# Patient Record
Sex: FEMALE | Race: BLACK OR AFRICAN AMERICAN | NOT HISPANIC OR LATINO | URBAN - METROPOLITAN AREA
[De-identification: names, ages, dates, MRNs, and addresses within clinical notes are randomized per-mention and may not be internally consistent; named-entity substitution may affect disease eponyms.]

---

## 2017-01-06 ENCOUNTER — EMERGENCY (EMERGENCY)
Facility: HOSPITAL | Age: 57
LOS: 1 days | Discharge: HOME | End: 2017-01-06
Attending: STUDENT IN AN ORGANIZED HEALTH CARE EDUCATION/TRAINING PROGRAM
Payer: COMMERCIAL

## 2017-01-06 VITALS
TEMPERATURE: 98 F | HEART RATE: 104 BPM | HEIGHT: 64 IN | RESPIRATION RATE: 20 BRPM | WEIGHT: 175.93 LBS | OXYGEN SATURATION: 97 % | DIASTOLIC BLOOD PRESSURE: 73 MMHG | SYSTOLIC BLOOD PRESSURE: 114 MMHG

## 2017-01-06 PROCEDURE — 87070 CULTURE OTHR SPECIMN AEROBIC: CPT

## 2017-01-06 PROCEDURE — 90715 TDAP VACCINE 7 YRS/> IM: CPT

## 2017-01-06 PROCEDURE — 99283 EMERGENCY DEPT VISIT LOW MDM: CPT | Mod: 25

## 2017-01-06 PROCEDURE — 87186 SC STD MICRODIL/AGAR DIL: CPT

## 2017-01-06 PROCEDURE — 10060 I&D ABSCESS SIMPLE/SINGLE: CPT

## 2017-01-06 RX ORDER — TETANUS TOXOID, REDUCED DIPHTHERIA TOXOID AND ACELLULAR PERTUSSIS VACCINE, ADSORBED 5; 2.5; 8; 8; 2.5 [IU]/.5ML; [IU]/.5ML; UG/.5ML; UG/.5ML; UG/.5ML
0.5 SUSPENSION INTRAMUSCULAR ONCE
Qty: 0 | Refills: 0 | Status: COMPLETED | OUTPATIENT
Start: 2017-01-06 | End: 2017-01-06

## 2017-01-06 RX ADMIN — Medication 300 MILLIGRAM(S): at 16:19

## 2017-01-06 RX ADMIN — TETANUS TOXOID, REDUCED DIPHTHERIA TOXOID AND ACELLULAR PERTUSSIS VACCINE, ADSORBED 0.5 MILLILITER(S): 5; 2.5; 8; 8; 2.5 SUSPENSION INTRAMUSCULAR at 16:19

## 2017-01-06 NOTE — ED STATDOCS - SKIN, MLM
Nickel sized area of fluctuance, tenderness with scant purulent drainage coming from the anterior proximal right thigh with 6cm surrounding erythema.

## 2017-01-06 NOTE — ED STATDOCS - NS ED MD SCRIBE ATTENDING SCRIBE SECTIONS
PAST MEDICAL/SURGICAL/SOCIAL HISTORY/REVIEW OF SYSTEMS/HISTORY OF PRESENT ILLNESS/HIV/DISPOSITION/PHYSICAL EXAM/VITAL SIGNS( Pullset)

## 2017-01-06 NOTE — ED STATDOCS - OBJECTIVE STATEMENT
56 y/o F presents to the ED complaining of redness, swelling, and drainage to right upper thigh x 3 days. Pt noted a red spot on her right thigh 2 days ago and states redness and swelling has worsened. She reports purulent drainage yesterday. Pt denies fevers, nausea, vomiting, weakness, dizziness, abdominal pain and back pain. She denies previous episodes. Pt has been applying antibiotic ointment. Pt denies tobacco and alcohol use. Adverse reaction to penicillin. Last Td > 5 years. No further complaints at this time.  PCP: Dr. Easton Guallpa

## 2017-01-06 NOTE — ED STATDOCS - DETAILS:
I, Colton Jenkins, performed the initial face to face bedside interview with this patient regarding history of present illness, review of symptoms and relevant past medical, social and family history.  I completed an independent physical examination.  I was the initial provider who evaluated this patient.  The history, relevant review of systems, past medical and surgical history, medical decision making, and physical examination was documented by the scribe in my presence and I attest to the accuracy of the documentation.  I have signed out the follow up of any pending tests (i.e. labs, radiological studies) to the ACP.  I have communicated the patient’s plan of care and disposition with the ACP.

## 2017-01-06 NOTE — ED ADULT NURSE NOTE - OBJECTIVE STATEMENT
pt presents to ED with right upper thigh redness with drainage x two days.  warm to touch. afebrile. denies any other complaints. a&ox3 PERRL maex4 lungs cta. denies chest pain/sob. abd soft nt nd +bs denies n/v/d, skin w/d/i. will continue to monitor and reassess

## 2017-01-08 ENCOUNTER — EMERGENCY (EMERGENCY)
Facility: HOSPITAL | Age: 57
LOS: 1 days | Discharge: HOME | End: 2017-01-08
Attending: EMERGENCY MEDICINE
Payer: COMMERCIAL

## 2017-01-08 VITALS
TEMPERATURE: 100 F | RESPIRATION RATE: 20 BRPM | OXYGEN SATURATION: 98 % | SYSTOLIC BLOOD PRESSURE: 100 MMHG | HEART RATE: 80 BPM | DIASTOLIC BLOOD PRESSURE: 71 MMHG

## 2017-01-08 VITALS — WEIGHT: 175.93 LBS

## 2017-01-08 PROCEDURE — 99283 EMERGENCY DEPT VISIT LOW MDM: CPT

## 2017-01-08 RX ORDER — AZTREONAM 2 G
1 VIAL (EA) INJECTION
Qty: 20 | Refills: 0 | OUTPATIENT
Start: 2017-01-08 | End: 2017-01-18

## 2017-01-08 RX ADMIN — Medication 1 TABLET(S): at 14:59

## 2017-01-08 NOTE — ED STATDOCS - CARE PLAN
Principal Discharge DX:	Suture check Principal Discharge DX:	Cellulitis  Secondary Diagnosis:	Wound check, abscess

## 2017-01-08 NOTE — ED STATDOCS - SKIN, MLM
Packing in place to anterior right thigh with 32cm length x 12cm width surrounding erythema. Packing removed with no expressible purulence.

## 2017-01-08 NOTE — ED STATDOCS - OBJECTIVE STATEMENT
This is a 57 year old female returning to the ED for wound check. Pt had I&D for right upper thigh abscess 2 days ago and returns today to have packing removed. She has been compliant with clindamycin. Pt reports drainage from abscess. Pt denies fevers and chills. Allergy to penicillin. No further complaints at this time.

## 2017-01-08 NOTE — ED STATDOCS - PROGRESS NOTE DETAILS
showed picture of initial lesion, measurement of erythema documented in prior chart  does not correspond  with picture shown. Cellulitis today appears more mild but with questionable medial spread.

## 2017-01-08 NOTE — ED STATDOCS - DETAILS:
I, Laurel Allen, performed the initial face to face bedside interview with this patient regarding history of present illness, review of symptoms and relevant past medical, social and family history.  I completed an independent physical examination.  I was the initial provider who evaluated this patient.   The history, relevant review of systems, past medical and surgical history, medical decision making, and physical examination was documented by the scribe in my presence and I attest to the accuracy of the documentation.

## 2017-01-08 NOTE — ED STATDOCS - NS ED MD SCRIBE ATTENDING SCRIBE SECTIONS
PAST MEDICAL/SURGICAL/SOCIAL HISTORY/DISPOSITION/REVIEW OF SYSTEMS/VITAL SIGNS( Pullset)/HISTORY OF PRESENT ILLNESS/HIV/PHYSICAL EXAM

## 2017-01-08 NOTE — ED STATDOCS - MEDICAL DECISION MAKING DETAILS
Interval evaluation of abscess. Wound appears clean, dry, with no persistent purulent drainage. Surrounding cellulitis, less erythematous with less amt of spread. culture reviewed but no sensitives reported. Adding Bactrim to increase coverage. Outlined erythema with marker and instructed pt to return to ED if develops fever or worsening spread of erythema.

## 2022-05-30 ENCOUNTER — NON-APPOINTMENT (OUTPATIENT)
Age: 62
End: 2022-05-30

## 2022-06-03 ENCOUNTER — NON-APPOINTMENT (OUTPATIENT)
Age: 62
End: 2022-06-03

## 2022-06-06 ENCOUNTER — TRANSCRIPTION ENCOUNTER (OUTPATIENT)
Age: 62
End: 2022-06-06

## 2022-06-07 ENCOUNTER — OUTPATIENT (OUTPATIENT)
Dept: OUTPATIENT SERVICES | Facility: HOSPITAL | Age: 62
LOS: 1 days | End: 2022-06-07

## 2022-06-07 ENCOUNTER — APPOINTMENT (OUTPATIENT)
Dept: DISASTER EMERGENCY | Facility: HOSPITAL | Age: 62
End: 2022-06-07

## 2022-06-07 VITALS
HEART RATE: 72 BPM | TEMPERATURE: 98 F | DIASTOLIC BLOOD PRESSURE: 74 MMHG | SYSTOLIC BLOOD PRESSURE: 107 MMHG | RESPIRATION RATE: 18 BRPM | OXYGEN SATURATION: 96 %

## 2022-06-07 VITALS
OXYGEN SATURATION: 97 % | WEIGHT: 171.96 LBS | HEIGHT: 64 IN | TEMPERATURE: 98 F | SYSTOLIC BLOOD PRESSURE: 142 MMHG | DIASTOLIC BLOOD PRESSURE: 88 MMHG | HEART RATE: 88 BPM | RESPIRATION RATE: 18 BRPM

## 2022-06-07 DIAGNOSIS — U07.1 COVID-19: ICD-10-CM

## 2022-06-07 RX ORDER — BEBTELOVIMAB 87.5 MG/ML
175 INJECTION, SOLUTION INTRAVENOUS ONCE
Refills: 0 | Status: COMPLETED | OUTPATIENT
Start: 2022-06-07 | End: 2022-06-07

## 2022-06-07 RX ADMIN — BEBTELOVIMAB 175 MILLIGRAM(S): 87.5 INJECTION, SOLUTION INTRAVENOUS at 11:50

## 2022-06-07 NOTE — MONOCLONAL ANTIBODY INFUSION - ASSESSMENT AND PLAN
61 y/o F with PMHx of DM2 and recently diagnosed with Covid-19 infection who was referred for monoclonal antibody infusion after testing positive for COVID 19 on 6/4/22.  Patient states she has been experiencing cough & congestion since 6/3/22.       PLAN:  - Injection procedure explained to patient   - Consent for monoclonal antibody injection obtained   - Risk & benefits discussed/all questions answered  - Inject Bebtelovimab 175 mg over 1 minutes   - Observe patient for one hour post administration    I have reviewed the Bebtelovimab Emergency Use Authorization (EUA) and I have provided the patient or patient's caregiver with the following information:    1. FDA has authorized emergency use Bebtelovimab, which is not an FDA-approved biological product.  2. The patient or patient's caregiver has the option to accept or refuse administration of Bebtelovimab.  3. The significant known and potential risks and benefits of Bebtelovimab and the extent to which such risks and benefits are unknown.  4. Information on available alternative treatments and risks and benefits of those alternatives.    The patient's COVID monoclonal antibody injection administration went well without any complications. The patient tolerated the treatment without any reactions. Vitals were stable throughout the injection & post-injection administration. The pt denies any CP, fevers, chills, SOB, numbness/tingling in b/l limbs, loss of sensation or motor function, N/V/D while receiving the injection. Patient denies any symptoms an hour after post injection. Vitals were taken post injection and were stable. Pt is medically stable to be discharged home. Discharge instructions were provided to the patient with a fact sheet included. Patient was instructed to self-isolate and use infection control measures (e.g wear mask, isolate, social distance, avoid sharing personal items, clean and disinfect "high touch" surfaces, and frequent handwashing according to the CDC guidelines. The patient was informed on what symptoms to be aware of for the next couple of days, and if there are any issues to call the 24/7 clinical call center. Patient was instructed to follow up with PCP as needed.

## 2022-06-07 NOTE — MONOCLONAL ANTIBODY INFUSION - HOME MEDICATIONS
Bactrim  mg-160 mg oral tablet , 1 tab(s) orally 2 times a day  Bactrim  mg-160 mg oral tablet , 1 tab(s) orally 2 times a day  clindamycin 300 mg oral capsule , 1 cap(s) orally every 6 hours

## 2022-06-29 NOTE — ED STATDOCS - CROS ED SKIN ALL NEG
Quality 130: Documentation Of Current Medications In The Medical Record: Current Medications Documented Quality 431: Preventive Care And Screening: Unhealthy Alcohol Use - Screening: Patient not identified as an unhealthy alcohol user when screened for unhealthy alcohol use using a systematic screening method Quality 110: Preventive Care And Screening: Influenza Immunization: Influenza immunization was not ordered or administered, reason not given Detail Level: Detailed Quality 226: Preventive Care And Screening: Tobacco Use: Screening And Cessation Intervention: Patient screened for tobacco use and is an ex/non-smoker - - -

## 2024-12-26 ENCOUNTER — OFFICE (OUTPATIENT)
Dept: URBAN - METROPOLITAN AREA CLINIC 100 | Facility: CLINIC | Age: 64
Setting detail: OPHTHALMOLOGY
End: 2024-12-26
Payer: COMMERCIAL

## 2024-12-26 DIAGNOSIS — H25.13: ICD-10-CM

## 2024-12-26 DIAGNOSIS — E11.9: ICD-10-CM

## 2024-12-26 PROCEDURE — 92004 COMPRE OPH EXAM NEW PT 1/>: CPT | Performed by: OPHTHALMOLOGY

## 2024-12-26 ASSESSMENT — VISUAL ACUITY
OD_BCVA: 20/30
OS_BCVA: 20/20-2

## 2024-12-26 ASSESSMENT — KERATOMETRY
METHOD_AUTO_MANUAL: AUTO
OD_K1POWER_DIOPTERS: 44.75
OS_K2POWER_DIOPTERS: 46.00
OD_K2POWER_DIOPTERS: 45.75
OS_AXISANGLE_DEGREES: 090
OS_K1POWER_DIOPTERS: 46.00
OD_AXISANGLE_DEGREES: 092

## 2024-12-26 ASSESSMENT — REFRACTION_MANIFEST
OS_SPHERE: -2.00
OD_CYLINDER: -1.00
OS_VA1: 20/25-3
OS_CYLINDER: -0.25
OD_SPHERE: -1.50
OD_VA1: 20/20
OD_AXIS: 160
OS_AXIS: 020

## 2024-12-26 ASSESSMENT — REFRACTION_AUTOREFRACTION
OS_SPHERE: -2.00
OS_CYLINDER: -0.25
OS_AXIS: 017
OD_CYLINDER: -1.00
OD_AXIS: 162
OD_SPHERE: -1.50

## 2024-12-26 ASSESSMENT — TONOMETRY
OS_IOP_MMHG: 17
OD_IOP_MMHG: 16

## 2024-12-26 ASSESSMENT — CONFRONTATIONAL VISUAL FIELD TEST (CVF)
OD_FINDINGS: FULL
OS_FINDINGS: FULL